# Patient Record
Sex: MALE | Race: OTHER | HISPANIC OR LATINO | URBAN - METROPOLITAN AREA
[De-identification: names, ages, dates, MRNs, and addresses within clinical notes are randomized per-mention and may not be internally consistent; named-entity substitution may affect disease eponyms.]

---

## 2024-03-25 ENCOUNTER — EMERGENCY (EMERGENCY)
Facility: HOSPITAL | Age: 59
LOS: 1 days | Discharge: HOME | End: 2024-03-25
Attending: EMERGENCY MEDICINE
Payer: SELF-PAY

## 2024-03-25 VITALS
RESPIRATION RATE: 15 BRPM | DIASTOLIC BLOOD PRESSURE: 85 MMHG | WEIGHT: 160.06 LBS | HEART RATE: 70 BPM | SYSTOLIC BLOOD PRESSURE: 151 MMHG | HEIGHT: 68 IN | TEMPERATURE: 98 F | OXYGEN SATURATION: 97 %

## 2024-03-25 VITALS
SYSTOLIC BLOOD PRESSURE: 136 MMHG | DIASTOLIC BLOOD PRESSURE: 88 MMHG | TEMPERATURE: 99 F | RESPIRATION RATE: 17 BRPM | HEART RATE: 64 BPM | OXYGEN SATURATION: 98 %

## 2024-03-25 PROCEDURE — 99282 EMERGENCY DEPT VISIT SF MDM: CPT

## 2024-03-25 PROCEDURE — 99284 EMERGENCY DEPT VISIT MOD MDM: CPT

## 2024-03-25 RX ORDER — VALACYCLOVIR 500 MG/1
1 TABLET, FILM COATED ORAL
Qty: 15 | Refills: 0
Start: 2024-03-25 | End: 2024-03-29
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
## 2024-03-25 NOTE — ED ADULT NURSE NOTE - NSFALLUNIVINTERV_ED_ALL_ED
Bed/Stretcher in lowest position, wheels locked, appropriate side rails in place/Call bell, personal items and telephone in reach/Instruct patient to call for assistance before getting out of bed/chair/stretcher/Non-slip footwear applied when patient is off stretcher/[place] to call system/Physically safe environment - no spills, clutter or unnecessary equipment/Purposeful proactive rounding/Room/bathroom lighting operational, light cord in reach

## 2024-03-25 NOTE — ED PROVIDER NOTE - NSFOLLOWUPINSTRUCTIONS_ED_ALL_ED_FT
Valtrex 1 g 3 times a day for 5 days.   your prescription at [facility] pharmacy on the first floor of [facility].  You may take Tylenol, ibuprofen, Naprosyn for pain or burning rash.  Follow-up with internal medicine clinic in 1 week time for further evaluation of your rash.  Avoid contact with elderly, infants and especially pregnant patients or patients receiving chemotherapy as they are high risk for complications.  Avoid touching the rash, and use gloves when washing the area with soap and water.  Return to the emergency department if you develop cough or difficulty breathing, headaches or confusion or if rash spreads to other parts of your body.

## 2024-03-25 NOTE — ED PROVIDER NOTE - OBJECTIVE STATEMENT
Attending note.  Patient was seen in room #42.  Patient complaining of sharp and itchy burning rash that began on the right anterior chest and back 4 days ago.  Developed blistering with a red base.  He denies any fever chills, headache, cough, difficulty breathing.  He is never had shingles previously.  He reports being well and with no chronic medical problems.  He takes no medications.  He has no allergies to medication.   #[id number] was utilized for history and physical examination.

## 2024-03-25 NOTE — ED PROVIDER NOTE - PATIENT PORTAL LINK FT
You can access the FollowMyHealth Patient Portal offered by [facility] by registering at the following website: [email or web address]. By joining [employer]’s FollowMyHealth portal, you will also be able to view your health information using other applications (apps) compatible with our system.

## 2024-03-25 NOTE — ED PROVIDER NOTE - CLINICAL SUMMARY MEDICAL DECISION MAKING FREE TEXT BOX
Attending note.  Rash consistent with uncomplicated shingles without dissemination, pneumonitis or encephalitis.  Complications of shingles was discussed with the patient via .  He is also advised to stay away from extremes of age, infants, pregnant patients.  He is also advised to avoid touching the rash and was provided with latex gloves.  He may use Tylenol or ibuprofen for pain.  He is referred to the medical clinic for further evaluation in case postherpetic neuralgia develops.  A prescription for Valtrex 1 g 3 times daily for 5 days [facility] to [facility] on [facility].  If he develops signs or symptoms of dissemination, encephalitis or pneumonitis, he understands to return to the emergency department immediately.  He also understands he is contagious until the rash is scabbed over.

## 2024-03-25 NOTE — ED ADULT NURSE NOTE - OBJECTIVE STATEMENT
Patient complaining of sharp and itchy burning rash that began on the right anterior chest and back 4 days ago.  Developed blistering with a red base.  He denies any fever chills, headache, cough, difficulty breathing.  He is never had shingles previously.  He reports being well and with no chronic medical problems.  He takes no medications.  He has no allergies to medication.

## 2024-03-25 NOTE — ED PROVIDER NOTE - PHYSICAL EXAMINATION
Attending note.  Patient is alert and in no acute distress.  Examination of the thorax reveals a vesicular rash in groups with erythemic base in single dermatome from anterior to posterior.  Rashes consistent with shingles.